# Patient Record
Sex: FEMALE | Race: WHITE | NOT HISPANIC OR LATINO
[De-identification: names, ages, dates, MRNs, and addresses within clinical notes are randomized per-mention and may not be internally consistent; named-entity substitution may affect disease eponyms.]

---

## 2017-08-29 ENCOUNTER — RESULT REVIEW (OUTPATIENT)
Age: 37
End: 2017-08-29

## 2018-03-20 ENCOUNTER — RESULT REVIEW (OUTPATIENT)
Age: 38
End: 2018-03-20

## 2018-04-13 ENCOUNTER — RESULT REVIEW (OUTPATIENT)
Age: 38
End: 2018-04-13

## 2019-05-17 ENCOUNTER — RECORD ABSTRACTING (OUTPATIENT)
Age: 39
End: 2019-05-17

## 2019-05-17 DIAGNOSIS — Z86.19 PERSONAL HISTORY OF OTHER INFECTIOUS AND PARASITIC DISEASES: ICD-10-CM

## 2019-05-17 DIAGNOSIS — Z82.49 FAMILY HISTORY OF ISCHEMIC HEART DISEASE AND OTHER DISEASES OF THE CIRCULATORY SYSTEM: ICD-10-CM

## 2019-05-17 DIAGNOSIS — Z83.3 FAMILY HISTORY OF DIABETES MELLITUS: ICD-10-CM

## 2019-05-17 DIAGNOSIS — B00.9 HERPESVIRAL INFECTION, UNSPECIFIED: ICD-10-CM

## 2019-05-17 DIAGNOSIS — R87.610 ATYPICAL SQUAMOUS CELLS OF UNDETERMINED SIGNIFICANCE ON CYTOLOGIC SMEAR OF CERVIX (ASC-US): ICD-10-CM

## 2019-05-17 LAB — CYTOLOGY CVX/VAG DOC THIN PREP: NORMAL

## 2019-05-20 ENCOUNTER — APPOINTMENT (OUTPATIENT)
Dept: OBGYN | Facility: CLINIC | Age: 39
End: 2019-05-20
Payer: MEDICAID

## 2019-05-20 ENCOUNTER — LABORATORY RESULT (OUTPATIENT)
Age: 39
End: 2019-05-20

## 2019-05-20 VITALS
BODY MASS INDEX: 21.66 KG/M2 | DIASTOLIC BLOOD PRESSURE: 78 MMHG | WEIGHT: 130 LBS | HEIGHT: 65 IN | SYSTOLIC BLOOD PRESSURE: 110 MMHG

## 2019-05-20 LAB
BILIRUB UR QL STRIP: NORMAL
CLARITY UR: CLEAR
COLLECTION METHOD: NORMAL
GLUCOSE UR-MCNC: NORMAL
HCG UR QL: 0.2 EU/DL
HCG UR QL: NEGATIVE
HGB UR QL STRIP.AUTO: NORMAL
KETONES UR-MCNC: NORMAL
LEUKOCYTE ESTERASE UR QL STRIP: NORMAL
NITRITE UR QL STRIP: NORMAL
PH UR STRIP: 5
PROT UR STRIP-MCNC: NORMAL
QUALITY CONTROL: YES
SP GR UR STRIP: 1.02

## 2019-05-20 PROCEDURE — 81003 URINALYSIS AUTO W/O SCOPE: CPT | Mod: QW

## 2019-05-20 PROCEDURE — 81025 URINE PREGNANCY TEST: CPT

## 2019-05-20 PROCEDURE — 99395 PREV VISIT EST AGE 18-39: CPT

## 2019-05-20 NOTE — COUNSELING
[Breast Self Exam] : breast self exam [Nutrition] : nutrition [Exercise] : exercise [Vitamins/Supplements] : vitamins/supplements [Contraception] : contraception [Sunscreen] : sunscreen [Safe Sexual Practices] : safe sexual practices

## 2019-05-20 NOTE — HISTORY OF PRESENT ILLNESS
[Good] : being in good health [Last Pap ___] : Last cervical pap smear was [unfilled] [Sexually Active] : is sexually active [Contraception] : uses contraception [Definite:  ___ (Date)] : the last menstrual period was [unfilled] [Menarche Age: ____] : age at menarche was [unfilled] [Natural Family Planning] : uses natural family planning [Condoms] : uses condoms [Male ___] : [unfilled] male [de-identified] : PHILLIP 8/5/16 BIRAD 1 [Burning] : no burning [Itching] : no itching [Mass] : no mass [Stinging] : no stinging [Lesion] : no lesion [Soreness] : no soreness [Discharge] : no discharge [Monogamous] : is not monogamous

## 2019-05-29 LAB
CYTOLOGY CVX/VAG DOC THIN PREP: NORMAL
HPV HIGH+LOW RISK DNA PNL CVX: DETECTED

## 2019-09-05 ENCOUNTER — TRANSCRIPTION ENCOUNTER (OUTPATIENT)
Age: 39
End: 2019-09-05

## 2019-09-05 ENCOUNTER — APPOINTMENT (OUTPATIENT)
Dept: OBGYN | Facility: CLINIC | Age: 39
End: 2019-09-05
Payer: MEDICAID

## 2019-09-05 VITALS
DIASTOLIC BLOOD PRESSURE: 62 MMHG | HEIGHT: 65 IN | BODY MASS INDEX: 21.66 KG/M2 | SYSTOLIC BLOOD PRESSURE: 106 MMHG | WEIGHT: 130 LBS

## 2019-09-05 DIAGNOSIS — Z78.9 OTHER SPECIFIED HEALTH STATUS: ICD-10-CM

## 2019-09-05 DIAGNOSIS — Z01.419 ENCOUNTER FOR GYNECOLOGICAL EXAMINATION (GENERAL) (ROUTINE) W/OUT ABNORMAL FINDINGS: ICD-10-CM

## 2019-09-05 DIAGNOSIS — Z80.3 FAMILY HISTORY OF MALIGNANT NEOPLASM OF BREAST: ICD-10-CM

## 2019-09-05 LAB
BILIRUB UR QL STRIP: NORMAL
COLLECTION METHOD: NORMAL
GLUCOSE UR-MCNC: NORMAL
HCG UR QL: 0.2 EU/DL
HCG UR QL: NEGATIVE
HGB UR QL STRIP.AUTO: NORMAL
KETONES UR-MCNC: NORMAL
LEUKOCYTE ESTERASE UR QL STRIP: NORMAL
NITRITE UR QL STRIP: NORMAL
PH UR STRIP: 7
PROT UR STRIP-MCNC: NORMAL
QUALITY CONTROL: YES
SP GR UR STRIP: 1.01

## 2019-09-05 PROCEDURE — 81003 URINALYSIS AUTO W/O SCOPE: CPT | Mod: QW

## 2019-09-05 PROCEDURE — 57454 BX/CURETT OF CERVIX W/SCOPE: CPT

## 2019-09-05 PROCEDURE — 81025 URINE PREGNANCY TEST: CPT

## 2019-09-05 RX ORDER — CHROMIUM 200 MCG
TABLET ORAL
Refills: 0 | Status: ACTIVE | COMMUNITY

## 2019-09-05 NOTE — HISTORY OF PRESENT ILLNESS
[Healthy Diet] : a healthy diet [Good] : being in good health [Regular Exercise] : regular exercise [Last Pap ___] : Last cervical pap smear was [unfilled] [Last Pap Smear ___] : last Papanicolaou cytology done [unfilled] [Reproductive Age] : is of reproductive age [Total Preg ___] : [unfilled] [Pregnancy History] : pregnancy history: [Living ___] : [unfilled] [AB Induced ___] : elective abortions: [unfilled] [Definite:  ___ (Date)] : the last menstrual period was [unfilled] [Menarche Age: ____] : age at menarche was [unfilled] [Sexually Active] : is sexually active [Male ___] : [unfilled] male [Weight Concerns] : no concerns with her weight [Menstrual Problems] : reports normal menses [Contraception] : does not use contraception [de-identified] : Breast ultrasound 8/05/2016 BI-RADS 1 [Monogamous] : is not monogamous

## 2019-09-05 NOTE — DISCUSSION/SUMMARY
[FreeTextEntry1] : 39 year old  1 para 0010, whose last menstrual period was 2019, is here for further evaluation of a her Pap smear from 2019 which showed human papilloma virus in the setting of negative cytology.\par \barrett RITCHIE had a Pap smear on 2017 that demonstrated atypical squamous cells of undetermined significance with oncogenic human papilloma virus.  She refused colposcopy despite the recommendation.  She subsequently had a Pap smear on 3/20/2018 which revealed low grade squamous intraepithelial lesion with oncogenic human papilloma virus.  Ms. SNOWDEN agreed to colposcopy which was performed by Dr. Grey on 2018.  The biopsies were negative.  \par \par The natural course of the human papilloma virus and the progressive development of cervical dysplasia and cancer were discussed at length with the patient.  Details about the colposcopic procedure were reviewed.\par \barrett RITCHIE is not using any form of contraception at this time.  She is in the process of  and we talked about how stress can affect the immune system.\par \par All of her concerns were addressed, questions answered and reassurance was given.

## 2019-09-05 NOTE — PHYSICAL EXAM
[Awake] : awake [Alert] : alert [Acute Distress] : no acute distress [Oriented x3] : oriented to person, place, and time [Labia Minora] : labia minora [Labia Majora] : labia major [Normal] : clitoris

## 2019-09-05 NOTE — PROCEDURE
[Colposcopy] : colposcopy [Patient] : patient [Risks] : risks [Alternatives] : alternatives [Benefits] : benefits [Consent Obtained] : written consent was obtained prior to the procedure [HPV high risk] : PCR positive for high risk HPV [Bleeding] : bleeding [Pap Performed] : a cervical Pap smear was not performed [Allergic Reaction] : allergic reaction [SCJ Fully Visulized] : the squamocolumnar junction was fully visualized [Non-staining ___ o'clock] : no staining at [unfilled] o'clock [Acetowhite ___ o'clock] : ascetowhite changes at [unfilled] ~Uo'clock [No Abnormalities] : no abnormalities seen [Biopsy] : the lesion was not biopsied [Biopsies Taken: # ___] : [unfilled] biopsies taken of the cervix [Biopsy Locations ___ o'clock] : the biopsies were taken at [unfilled] o'clock [Maciej's] : Maciej's solution [ECC Done] : Endocervical curettage was performed.  [Tolerated Well] : the patient tolerated the procedure well [Direct Pressure] : direct pressure [No Complications] : there were no complications

## 2019-09-09 ENCOUNTER — TRANSCRIPTION ENCOUNTER (OUTPATIENT)
Age: 39
End: 2019-09-09

## 2019-09-09 LAB — CORE LAB BIOPSY: NORMAL

## 2019-09-17 ENCOUNTER — APPOINTMENT (OUTPATIENT)
Dept: OBGYN | Facility: CLINIC | Age: 39
End: 2019-09-17
Payer: MEDICAID

## 2019-09-17 ENCOUNTER — TRANSCRIPTION ENCOUNTER (OUTPATIENT)
Age: 39
End: 2019-09-17

## 2019-09-17 VITALS
SYSTOLIC BLOOD PRESSURE: 100 MMHG | DIASTOLIC BLOOD PRESSURE: 60 MMHG | BODY MASS INDEX: 21.66 KG/M2 | HEIGHT: 65 IN | WEIGHT: 130 LBS

## 2019-09-17 DIAGNOSIS — N76.0 ACUTE VAGINITIS: ICD-10-CM

## 2019-09-17 PROCEDURE — 99213 OFFICE O/P EST LOW 20 MIN: CPT

## 2019-09-17 NOTE — PHYSICAL EXAM
[Normal] : uterus [Scant] : scant [Discharge] : a  ~M vaginal discharge was present [Nancy] : yellow [Watery] : watery [No Bleeding] : there was no active vaginal bleeding [Motion Tenderness] : there was no cervical motion tenderness [Tenderness] : nontender [Enlarged ___ wks] : not enlarged [Mass ___ cm] : no uterine mass was palpated [Uterine Adnexae] : were not tender and not enlarged

## 2019-09-17 NOTE — DISCUSSION/SUMMARY
[FreeTextEntry1] : cultures obtained.\par \par treatment for vaginitis initiated.\par \par f/u culture results.\par \par recommended to avoid intercourse x 2 weeks. if post coital bleeding returns after that time she will f/u for further evaluation.

## 2019-09-17 NOTE — HISTORY OF PRESENT ILLNESS
[___ Month(s) Ago] : [unfilled] month(s) ago [Good] : being in good health [Last Pap ___] : Last cervical pap smear was [unfilled] [Reproductive Age] : is of reproductive age [Definite:  ___ (Date)] : the last menstrual period was [unfilled] [Contraception] : does not use contraception

## 2019-09-19 LAB
C TRACH RRNA SPEC QL NAA+PROBE: NOT DETECTED
CANDIDA VAG CYTO: NOT DETECTED
G VAGINALIS+PREV SP MTYP VAG QL MICRO: NOT DETECTED
N GONORRHOEA RRNA SPEC QL NAA+PROBE: NOT DETECTED
SOURCE AMPLIFICATION: NORMAL
T VAGINALIS VAG QL WET PREP: NOT DETECTED

## 2020-01-15 ENCOUNTER — APPOINTMENT (OUTPATIENT)
Dept: OBGYN | Facility: CLINIC | Age: 40
End: 2020-01-15
Payer: MEDICAID

## 2020-01-15 VITALS
BODY MASS INDEX: 20.83 KG/M2 | WEIGHT: 125 LBS | DIASTOLIC BLOOD PRESSURE: 64 MMHG | HEIGHT: 65 IN | SYSTOLIC BLOOD PRESSURE: 108 MMHG

## 2020-01-15 PROCEDURE — 99213 OFFICE O/P EST LOW 20 MIN: CPT

## 2020-01-18 LAB
C TRACH RRNA SPEC QL NAA+PROBE: NOT DETECTED
CANDIDA VAG CYTO: NOT DETECTED
G VAGINALIS+PREV SP MTYP VAG QL MICRO: DETECTED
N GONORRHOEA RRNA SPEC QL NAA+PROBE: NOT DETECTED
SOURCE AMPLIFICATION: NORMAL
T VAGINALIS VAG QL WET PREP: NOT DETECTED

## 2020-01-20 NOTE — PHYSICAL EXAM
[Moderate] : moderate [Discharge] : a  ~M vaginal discharge was present [White] : white [Thin] : thin

## 2020-01-20 NOTE — HISTORY OF PRESENT ILLNESS
[Last Pap ___] : Last cervical pap smear was [unfilled] [Menarche Age: ____] : age at menarche was [unfilled] [Definite:  ___ (Date)] : the last menstrual period was [unfilled] [Sexually Active] : is sexually active [Male ___] : [unfilled] male [de-identified] : Breast u/s: 8/5/16 BR1 [Contraception] : does not use contraception

## 2020-01-20 NOTE — COUNSELING
[Bladder Hygiene] : bladder hygiene [Vulvar Hygiene] : vulvar hygiene [Safe Sexual Practices] : safe sexual practices

## 2020-12-21 PROBLEM — N76.0 ACUTE VAGINITIS: Status: RESOLVED | Noted: 2019-09-17 | Resolved: 2020-12-21

## 2021-01-21 ENCOUNTER — APPOINTMENT (OUTPATIENT)
Dept: OBGYN | Facility: CLINIC | Age: 41
End: 2021-01-21
Payer: MEDICAID

## 2021-01-21 ENCOUNTER — LABORATORY RESULT (OUTPATIENT)
Age: 41
End: 2021-01-21

## 2021-01-21 VITALS
SYSTOLIC BLOOD PRESSURE: 122 MMHG | DIASTOLIC BLOOD PRESSURE: 68 MMHG | WEIGHT: 127 LBS | BODY MASS INDEX: 21.16 KG/M2 | HEIGHT: 65 IN

## 2021-01-21 DIAGNOSIS — Z63.5 DISRUPTION OF FAMILY BY SEPARATION AND DIVORCE: ICD-10-CM

## 2021-01-21 DIAGNOSIS — Z01.419 ENCOUNTER FOR GYNECOLOGICAL EXAMINATION (GENERAL) (ROUTINE) W/OUT ABNORMAL FINDINGS: ICD-10-CM

## 2021-01-21 DIAGNOSIS — Z30.09 ENCOUNTER FOR OTHER GENERAL COUNSELING AND ADVICE ON CONTRACEPTION: ICD-10-CM

## 2021-01-21 PROCEDURE — 99072 ADDL SUPL MATRL&STAF TM PHE: CPT

## 2021-01-21 PROCEDURE — 99395 PREV VISIT EST AGE 18-39: CPT

## 2021-01-21 RX ORDER — METRONIDAZOLE 7.5 MG/G
0.75 GEL VAGINAL
Qty: 1 | Refills: 1 | Status: DISCONTINUED | COMMUNITY
Start: 2019-09-17 | End: 2021-01-21

## 2021-01-21 RX ORDER — METRONIDAZOLE 7.5 MG/G
0.75 GEL VAGINAL
Qty: 1 | Refills: 1 | Status: DISCONTINUED | COMMUNITY
Start: 2020-01-15 | End: 2021-01-21

## 2021-01-21 SDOH — SOCIAL STABILITY - SOCIAL INSECURITY: DISRUPTION OF FAMILY BY SEPARATION AND DIVORCE: Z63.5

## 2021-01-24 LAB
C TRACH RRNA SPEC QL NAA+PROBE: NOT DETECTED
HPV HIGH+LOW RISK DNA PNL CVX: DETECTED
N GONORRHOEA RRNA SPEC QL NAA+PROBE: NOT DETECTED
SOURCE AMPLIFICATION: NORMAL
SOURCE TP AMPLIFICATION: NORMAL
T VAGINALIS RRNA SPEC QL NAA+PROBE: NOT DETECTED

## 2021-01-24 NOTE — HISTORY OF PRESENT ILLNESS
[Patient reported PAP Smear was normal] : Patient reported PAP Smear was normal [HPV test offered] : HPV test offered [N] : Patient does not use contraception [Y] : Positive pregnancy history [Menarche Age: ____] : age at menarche was [unfilled] [Currently Active] : currently active [Men] : men [No] : No [TextBox_4] : Pt presents for an annual. [BreastSonogramDate] : 08/05/2016 [TextBox_25] : BR1 [PapSmeardate] : 05/20/2019 [HPVDate] : 05/20/2019 [TextBox_78] : POSITIVE [LMPDate] : 12/30/2020 [PGHxTotal] : 2 [[facility] CenterxFulerm] : 1 [[facility] Centeriving] : 1 [PGHxABInduced] : 1 [FreeTextEntry1] : 12/30/2020

## 2021-01-28 LAB — CYTOLOGY CVX/VAG DOC THIN PREP: ABNORMAL

## 2021-02-01 ENCOUNTER — APPOINTMENT (OUTPATIENT)
Dept: OBGYN | Facility: CLINIC | Age: 41
End: 2021-02-01
Payer: MEDICAID

## 2021-02-03 ENCOUNTER — APPOINTMENT (OUTPATIENT)
Dept: OBGYN | Facility: CLINIC | Age: 41
End: 2021-02-03
Payer: MEDICAID

## 2021-02-03 ENCOUNTER — ASOB RESULT (OUTPATIENT)
Age: 41
End: 2021-02-03

## 2021-02-03 PROCEDURE — 76830 TRANSVAGINAL US NON-OB: CPT

## 2021-02-03 PROCEDURE — 99072 ADDL SUPL MATRL&STAF TM PHE: CPT

## 2021-02-03 PROCEDURE — 76376 3D RENDER W/INTRP POSTPROCES: CPT | Mod: 59

## 2021-02-05 ENCOUNTER — NON-APPOINTMENT (OUTPATIENT)
Age: 41
End: 2021-02-05

## 2021-03-01 ENCOUNTER — APPOINTMENT (OUTPATIENT)
Dept: OBGYN | Facility: CLINIC | Age: 41
End: 2021-03-01
Payer: MEDICAID

## 2021-03-01 VITALS
SYSTOLIC BLOOD PRESSURE: 116 MMHG | HEIGHT: 65 IN | TEMPERATURE: 97.1 F | BODY MASS INDEX: 21.83 KG/M2 | DIASTOLIC BLOOD PRESSURE: 74 MMHG | WEIGHT: 131 LBS

## 2021-03-01 LAB
HCG UR QL: NEGATIVE
QUALITY CONTROL: YES

## 2021-03-01 PROCEDURE — 81025 URINE PREGNANCY TEST: CPT

## 2021-03-01 PROCEDURE — 99072 ADDL SUPL MATRL&STAF TM PHE: CPT

## 2021-03-01 PROCEDURE — 57454 BX/CURETT OF CERVIX W/SCOPE: CPT

## 2021-03-01 NOTE — HISTORY OF PRESENT ILLNESS
[N] : Patient does not use contraception [Y] : Positive pregnancy history [Menarche Age: ____] : age at menarche was [unfilled] [Currently Active] : currently active [Men] : men [Vaginal] : vaginal [No] : No [Patient refuses STI testing] : Patient refuses STI testing [TextBox_4] : pt states coming in for colposcopy [Mammogramdate] : never [BreastSonogramDate] : 8/5/16 [PapSmeardate] : 1/21/21 [TextBox_31] : asc-us [BoneDensityDate] : never [ColonoscopyDate] : never [HPVDate] : 1/21/21 [TextBox_78] : neg [LMPDate] : 2/19/21 [PGxTotal] : 1 [[facility] CenterxLiving] : 0 [PGHxABInduced] : 1 [FreeTextEntry1] : 2/19/21

## 2021-03-01 NOTE — PROCEDURE
[Colposcopy] : Colposcopy  [Time out performed] : Pre-procedure time out performed.  Patient's name, date of birth and procedure confirmed. [Consent Obtained] : Consent obtained [Risks] : risks [Benefits] : benefits [Alternatives] : alternatives [Patient] : patient [Infection] : infection [Bleeding] : bleeding [Allergic Reaction] : allergic reaction [ASCUS] : ASCUS [Colposcopy Adequate] : colposcopy adequate [ECC Performed] : ECC performed [Biopsy] : biopsy taken [Hemostasis Obtained] : Hemostasis obtained [Tolerated Well] : the patient tolerated the procedure well [HPV High Risk] : HPV high risk [No Abnormalities] : no abnormalities [de-identified] : \par  [de-identified] : 2 [de-identified] : Acetowhite area at 3 o'clock of cervix after application of Acetic Acid. No non-staining areas after application of Lugol's solution.\par  [de-identified] : 3 o'clock of cervix and endocervical curetting [de-identified] : 3 o'clock of cervix and endocervical curetting [de-identified] : with direct pressure and Monsel's solution.

## 2021-03-01 NOTE — PROCEDURE
[Colposcopy] : Colposcopy  [Time out performed] : Pre-procedure time out performed.  Patient's name, date of birth and procedure confirmed. [Consent Obtained] : Consent obtained [Risks] : risks [Benefits] : benefits [Alternatives] : alternatives [Patient] : patient [Infection] : infection [Bleeding] : bleeding [Allergic Reaction] : allergic reaction [ASCUS] : ASCUS [Colposcopy Adequate] : colposcopy adequate [ECC Performed] : ECC performed [Biopsy] : biopsy taken [Hemostasis Obtained] : Hemostasis obtained [Tolerated Well] : the patient tolerated the procedure well [HPV High Risk] : HPV high risk [No Abnormalities] : no abnormalities [de-identified] : \par  [de-identified] : 2 [de-identified] : Acetowhite area at 3 o'clock of cervix after application of Acetic Acid. No non-staining areas after application of Lugol's solution.\par  [de-identified] : 3 o'clock of cervix and endocervical curetting [de-identified] : 3 o'clock of cervix and endocervical curetting [de-identified] : with direct pressure and Monsel's solution.

## 2021-03-01 NOTE — END OF VISIT
[FreeTextEntry3] : I, lAea Morley, solely acted as a scribe for Dr. Janis Vale on 03/01/2021 . All medical entries made by the Scribe were at my, Dr. Navarro's, direction and personally dictated by me on 03/01/2021. I have reviewed the chart and agree that the record accurately reflects my personal performance of the history, physical exam, assessment and plan. I have also personally directed, reviewed and agreed with the chart.

## 2021-03-01 NOTE — DISCUSSION/SUMMARY
[FreeTextEntry1] : - Colposcopy done today for pap: ASCUS, HPV HR positive. Procedure details, r/b/a were discussed. Consent was signed. Please see procedure details above. Pelvic rest x 2 weeks discussed. Post procedure expectations and call parameters were reviewed. \par \par  - Prior pelvic sono on 2/5/21, which was ordered for post-coital bleeding, was significant for "Irregular endocervical cavity walls, polyps suspected". Endocervical curetting done today. We discussed possible repeat ultrasound or hysteroscopy if she continues to have PMB.\par \par - She will call back office in 2 weeks for results.

## 2021-03-01 NOTE — HISTORY OF PRESENT ILLNESS
[N] : Patient does not use contraception [Y] : Positive pregnancy history [Menarche Age: ____] : age at menarche was [unfilled] [Currently Active] : currently active [Men] : men [Vaginal] : vaginal [No] : No [Patient refuses STI testing] : Patient refuses STI testing [TextBox_4] : pt states coming in for colposcopy [Mammogramdate] : never [BreastSonogramDate] : 8/5/16 [PapSmeardate] : 1/21/21 [TextBox_31] : asc-us [BoneDensityDate] : never [ColonoscopyDate] : never [HPVDate] : 1/21/21 [TextBox_78] : neg [LMPDate] : 2/19/21 [PGxTotal] : 1 [[facility]xLiving] : 0 [PGHxABInduced] : 1 [FreeTextEntry1] : 2/19/21

## 2021-03-01 NOTE — END OF VISIT
[FreeTextEntry3] : I, Alea Morley, solely acted as a scribe for Dr. Janis Vale on 03/01/2021 . All medical entries made by the Scribe were at my, Dr. Navarro's, direction and personally dictated by me on 03/01/2021. I have reviewed the chart and agree that the record accurately reflects my personal performance of the history, physical exam, assessment and plan. I have also personally directed, reviewed and agreed with the chart.

## 2021-03-22 ENCOUNTER — NON-APPOINTMENT (OUTPATIENT)
Age: 41
End: 2021-03-22

## 2021-04-06 LAB — CORE LAB BIOPSY: NORMAL

## 2021-10-21 ENCOUNTER — NON-APPOINTMENT (OUTPATIENT)
Age: 41
End: 2021-10-21

## 2021-10-22 ENCOUNTER — APPOINTMENT (OUTPATIENT)
Dept: OBGYN | Facility: CLINIC | Age: 41
End: 2021-10-22
Payer: MEDICAID

## 2021-10-22 ENCOUNTER — NON-APPOINTMENT (OUTPATIENT)
Age: 41
End: 2021-10-22

## 2021-10-22 VITALS
HEIGHT: 65 IN | WEIGHT: 125 LBS | BODY MASS INDEX: 20.83 KG/M2 | SYSTOLIC BLOOD PRESSURE: 110 MMHG | DIASTOLIC BLOOD PRESSURE: 68 MMHG

## 2021-10-22 LAB
HCG UR QL: NEGATIVE
QUALITY CONTROL: YES

## 2021-10-22 PROCEDURE — 99213 OFFICE O/P EST LOW 20 MIN: CPT

## 2021-10-22 PROCEDURE — 81025 URINE PREGNANCY TEST: CPT

## 2021-10-22 NOTE — PHYSICAL EXAM
[Appropriately responsive] : appropriately responsive [Alert] : alert [No Acute Distress] : no acute distress [Soft] : soft [Non-tender] : non-tender [Non-distended] : non-distended [Oriented x3] : oriented x3 [Labia Majora] : normal [Labia Minora] : normal [Discharge] : a  ~M vaginal discharge was present [Scant] : scant [White] : white [Thin] : thin [No Bleeding] : There was no active vaginal bleeding [Normal] : normal [Uterine Adnexae] : normal [Foul Smelling] : not foul smelling

## 2021-10-22 NOTE — HISTORY OF PRESENT ILLNESS
[N] : Patient does not use contraception [Monogamous (Male Partner)] : is monogamous with a male partner [Y] : Positive pregnancy history [Ultrasound] : ultrasound [Menarche Age: ____] : age at menarche was [unfilled] [Currently Active] : currently active [Men] : men [Vaginal] : vaginal [No] : No [TextBox_4] : Pt presents with complaints of bleeding after intercourse [PapSmeardate] : 01/21/2021 [TextBox_31] : ASCUS,HPV POS [GonorrheaDate] : 01/21/2021 [TextBox_63] : NEG [ChlamydiaDate] : 01/21/2021 [TextBox_68] : NEG [TrichomonasDate] : 01/21/2021 [TextBox_73] : NEG [HPVDate] : 01/21/2021 [TextBox_78] : POSITIVE [LMPDate] : 10/03/2021 [PGxTotal] : 1 [PGHxABInduced] : 1 [TextBox_27] : 02/03/2021 [FreeTextEntry1] : 10/03/2021

## 2021-10-22 NOTE — DISCUSSION/SUMMARY
[FreeTextEntry1] : \par Negative urine pregnancy test.\par \par We discussed the benign findings on physical exam. Affirm culture collected to r/o vaginitis. Will follow up with results and tx prn. Patient was educated about vulvar hygiene. Recommend avoidance of douching, perfumes, and lotions on/around vulva and in vagina. Reviewed recommendations for fragrance-free detergents and lotion. Reviewed recommendations for sleepwear and cotton underwear, avoidance of synthetic fabrics. Recommended trial of organic cotton pads. Avoid using panty liners and pads. \par \par Differentials for post coital bleeding reviewed with patient. Pelvic ultrasound to be done and gyn followup afterwards to review sonogram. \par \par All questions and concerns were addressed.\par \par RTO for pelvic sono and as needed.

## 2021-10-25 DIAGNOSIS — N76.0 ACUTE VAGINITIS: ICD-10-CM

## 2021-10-25 DIAGNOSIS — B96.89 ACUTE VAGINITIS: ICD-10-CM

## 2021-10-27 ENCOUNTER — NON-APPOINTMENT (OUTPATIENT)
Age: 41
End: 2021-10-27

## 2021-10-28 ENCOUNTER — NON-APPOINTMENT (OUTPATIENT)
Age: 41
End: 2021-10-28

## 2021-11-05 ENCOUNTER — APPOINTMENT (OUTPATIENT)
Dept: OBGYN | Facility: CLINIC | Age: 41
End: 2021-11-05
Payer: MEDICAID

## 2021-11-05 ENCOUNTER — ASOB RESULT (OUTPATIENT)
Age: 41
End: 2021-11-05

## 2021-11-05 VITALS
DIASTOLIC BLOOD PRESSURE: 68 MMHG | BODY MASS INDEX: 20.83 KG/M2 | SYSTOLIC BLOOD PRESSURE: 110 MMHG | HEIGHT: 65 IN | WEIGHT: 125 LBS

## 2021-11-05 PROCEDURE — 76830 TRANSVAGINAL US NON-OB: CPT

## 2021-11-05 PROCEDURE — 99213 OFFICE O/P EST LOW 20 MIN: CPT | Mod: 25

## 2021-11-05 NOTE — DISCUSSION/SUMMARY
[FreeTextEntry1] : 40 y/o \par \par 21 US: uterus 9 cm. EMs 9.9. RO 2.99 with 2 cm simple cyst. LO 2.75 cm. \par \par #hx of PCB\par -suspect due to prior BV\par -sx have resolved for both PCB and BV\par -vaginal hygiene discussed\par -con't probiotic\par -normal US reviewed\par \par RTO in 2022 for her gyn annual exam to repeat pap or prn.

## 2021-11-05 NOTE — END OF VISIT
[FreeTextEntry3] : I, Alea Morley, solely acted as a scribe for Dr. Elodia Schafer on 11/05/2021. All medical entries made by the Scribe were at my, Dr. Schafer's, direction and personally dictated by me on 11/05/2021. I have reviewed the chart and agree that the record accurately reflects my personal performance of the history, physical exam, assessment and plan. I have also personally directed, reviewed and agreed with the chart.  [Time Spent: ___ minutes] : I have spent [unfilled] minutes of time on the encounter.

## 2021-11-05 NOTE — HISTORY OF PRESENT ILLNESS
[Gonorrhea test offered] : Gonorrhea test offered [Chlamydia test offered] : Chlamydia test offered [HPV test offered] : HPV test offered [N] : Patient does not use contraception [Y] : Patient is sexually active [Menarche Age: ____] : age at menarche was [unfilled] [Currently Active] : currently active [Men] : men [No] : No [BreastSonogramDate] : 8/5/16 [TextBox_25] : BR1 [PapSmeardate] : 1/21/21 [TextBox_31] : ASCUS [GonorrheaDate] : 10/22/21 [TextBox_63] : NEG [ChlamydiaDate] : 10/22/21 [TextBox_68] : NEG [HPVDate] : 1/21/21 [TextBox_78] : POSITIVE [LMPDate] : 10/27/21 [PGxTotal] : 1 [PGHxABInduced] : 1 [FreeTextEntry1] : 10/27/21

## 2021-12-26 ENCOUNTER — NON-APPOINTMENT (OUTPATIENT)
Age: 41
End: 2021-12-26

## 2022-02-03 ENCOUNTER — APPOINTMENT (OUTPATIENT)
Dept: OBGYN | Facility: CLINIC | Age: 42
End: 2022-02-03

## 2022-02-07 ENCOUNTER — NON-APPOINTMENT (OUTPATIENT)
Age: 42
End: 2022-02-07

## 2022-02-10 ENCOUNTER — LABORATORY RESULT (OUTPATIENT)
Age: 42
End: 2022-02-10

## 2022-02-10 ENCOUNTER — APPOINTMENT (OUTPATIENT)
Dept: OBGYN | Facility: CLINIC | Age: 42
End: 2022-02-10
Payer: MEDICAID

## 2022-02-10 VITALS
HEIGHT: 65 IN | BODY MASS INDEX: 20.49 KG/M2 | SYSTOLIC BLOOD PRESSURE: 140 MMHG | WEIGHT: 123 LBS | DIASTOLIC BLOOD PRESSURE: 86 MMHG

## 2022-02-10 DIAGNOSIS — N89.8 OTHER SPECIFIED NONINFLAMMATORY DISORDERS OF VAGINA: ICD-10-CM

## 2022-02-10 DIAGNOSIS — N93.0 POSTCOITAL AND CONTACT BLEEDING: ICD-10-CM

## 2022-02-10 DIAGNOSIS — R87.810 ATYPICAL SQUAMOUS CELLS OF UNDETERMINED SIGNIFICANCE ON CYTOLOGIC SMEAR OF CERVIX (ASC-US): ICD-10-CM

## 2022-02-10 DIAGNOSIS — Z11.51 ENCOUNTER FOR SCREENING FOR HUMAN PAPILLOMAVIRUS (HPV): ICD-10-CM

## 2022-02-10 DIAGNOSIS — Z11.3 ENCOUNTER FOR SCREENING FOR INFECTIONS WITH A PREDOMINANTLY SEXUAL MODE OF TRANSMISSION: ICD-10-CM

## 2022-02-10 DIAGNOSIS — R87.610 ATYPICAL SQUAMOUS CELLS OF UNDETERMINED SIGNIFICANCE ON CYTOLOGIC SMEAR OF CERVIX (ASC-US): ICD-10-CM

## 2022-02-10 DIAGNOSIS — N90.7 VULVAR CYST: ICD-10-CM

## 2022-02-10 DIAGNOSIS — Z01.419 ENCOUNTER FOR GYNECOLOGICAL EXAMINATION (GENERAL) (ROUTINE) W/OUT ABNORMAL FINDINGS: ICD-10-CM

## 2022-02-10 PROCEDURE — 99395 PREV VISIT EST AGE 18-39: CPT

## 2022-02-13 PROBLEM — Z11.3 SCREEN FOR STD (SEXUALLY TRANSMITTED DISEASE): Status: RESOLVED | Noted: 2020-01-20 | Resolved: 2022-02-13

## 2022-02-13 PROBLEM — N90.7 SEBACEOUS CYST OF LABIA: Status: RESOLVED | Noted: 2020-01-20 | Resolved: 2022-02-13

## 2022-02-13 PROBLEM — N93.0 POSTCOITAL BLEEDING: Status: RESOLVED | Noted: 2019-09-17 | Resolved: 2022-02-13

## 2022-02-13 PROBLEM — R87.610 ASCUS WITH POSITIVE HIGH RISK HPV CERVICAL: Status: RESOLVED | Noted: 2021-03-01 | Resolved: 2022-02-13

## 2022-02-13 PROBLEM — N93.0 PCB (POST COITAL BLEEDING): Status: RESOLVED | Noted: 2021-01-21 | Resolved: 2022-02-13

## 2022-02-13 RX ORDER — METRONIDAZOLE 7.5 MG/G
0.75 GEL VAGINAL
Qty: 1 | Refills: 0 | Status: DISCONTINUED | COMMUNITY
Start: 2021-10-25 | End: 2022-02-13

## 2022-02-13 NOTE — DISCUSSION/SUMMARY
[FreeTextEntry1] : Pt aware pending pap result any further f/u.  Pt All the pt's questions and concerns were addressed.

## 2022-02-13 NOTE — HISTORY OF PRESENT ILLNESS
[N] : Patient does not use contraception [Y] : Positive pregnancy history [Menarche Age: ____] : age at menarche was [unfilled] [No] : Patient does not have concerns regarding sex [Currently Active] : currently active [BreastSonogramDate] : 08/05/16 [TextBox_25] : BR1 [PapSmeardate] : 01/21/21 [TextBox_31] : ASCUS [GonorrheaDate] : 10/22/21 [TextBox_63] : NEG [ChlamydiaDate] : 10/22/21 [TextBox_68] : NEG [HPVDate] : 01/21/21 [TextBox_78] : POS [LMPDate] : 01/31/22 [PGxTotal] : 1 [[facility]xLiving] : 0 [PGHxABInduced] : 1 [FreeTextEntry1] : 01/31/22

## 2022-03-01 ENCOUNTER — NON-APPOINTMENT (OUTPATIENT)
Age: 42
End: 2022-03-01

## 2022-03-06 LAB
CYTOLOGY CVX/VAG DOC THIN PREP: ABNORMAL
HPV HIGH+LOW RISK DNA PNL CVX: DETECTED

## 2022-04-11 ENCOUNTER — APPOINTMENT (OUTPATIENT)
Dept: OBGYN | Facility: CLINIC | Age: 42
End: 2022-04-11

## 2022-04-22 ENCOUNTER — APPOINTMENT (OUTPATIENT)
Dept: OBGYN | Facility: CLINIC | Age: 42
End: 2022-04-22
Payer: MEDICAID

## 2022-04-22 ENCOUNTER — ASOB RESULT (OUTPATIENT)
Age: 42
End: 2022-04-22

## 2022-04-22 ENCOUNTER — APPOINTMENT (OUTPATIENT)
Dept: ANTEPARTUM | Facility: CLINIC | Age: 42
End: 2022-04-22
Payer: MEDICAID

## 2022-04-22 ENCOUNTER — LABORATORY RESULT (OUTPATIENT)
Age: 42
End: 2022-04-22

## 2022-04-22 VITALS
DIASTOLIC BLOOD PRESSURE: 64 MMHG | WEIGHT: 126 LBS | BODY MASS INDEX: 20.99 KG/M2 | SYSTOLIC BLOOD PRESSURE: 112 MMHG | HEIGHT: 65 IN

## 2022-04-22 DIAGNOSIS — Z13.29 ENCOUNTER FOR SCREENING FOR OTHER SUSPECTED ENDOCRINE DISORDER: ICD-10-CM

## 2022-04-22 DIAGNOSIS — Z11.3 ENCOUNTER FOR SCREENING FOR INFECTIONS WITH A PREDOMINANTLY SEXUAL MODE OF TRANSMISSION: ICD-10-CM

## 2022-04-22 PROCEDURE — 76817 TRANSVAGINAL US OBSTETRIC: CPT

## 2022-04-22 PROCEDURE — 99214 OFFICE O/P EST MOD 30 MIN: CPT | Mod: TH

## 2022-04-24 LAB
BASOPHILS # BLD AUTO: 0.03 K/UL
BASOPHILS NFR BLD AUTO: 0.4 %
C TRACH RRNA SPEC QL NAA+PROBE: NOT DETECTED
EOSINOPHIL # BLD AUTO: 0.11 K/UL
EOSINOPHIL NFR BLD AUTO: 1.3 %
ESTIMATED AVERAGE GLUCOSE: 105 MG/DL
HBA1C MFR BLD HPLC: 5.3 %
HBV SURFACE AG SER QL: NONREACTIVE
HCT VFR BLD CALC: 38.8 %
HGB BLD-MCNC: 12.9 G/DL
HIV1+2 AB SPEC QL IA.RAPID: NONREACTIVE
IMM GRANULOCYTES NFR BLD AUTO: 0.2 %
LYMPHOCYTES # BLD AUTO: 1.52 K/UL
LYMPHOCYTES NFR BLD AUTO: 17.9 %
M TB IFN-G BLD-IMP: NEGATIVE
MAN DIFF?: NORMAL
MCHC RBC-ENTMCNC: 32 PG
MCHC RBC-ENTMCNC: 33.2 GM/DL
MCV RBC AUTO: 96.3 FL
MEV IGG FLD QL IA: >300 AU/ML
MEV IGG+IGM SER-IMP: POSITIVE
MONOCYTES # BLD AUTO: 0.55 K/UL
MONOCYTES NFR BLD AUTO: 6.5 %
N GONORRHOEA RRNA SPEC QL NAA+PROBE: NOT DETECTED
NEUTROPHILS # BLD AUTO: 6.27 K/UL
NEUTROPHILS NFR BLD AUTO: 73.7 %
PLATELET # BLD AUTO: 250 K/UL
QUANTIFERON TB PLUS MITOGEN MINUS NIL: 9.99 IU/ML
QUANTIFERON TB PLUS NIL: 0.01 IU/ML
QUANTIFERON TB PLUS TB1 MINUS NIL: 0.01 IU/ML
QUANTIFERON TB PLUS TB2 MINUS NIL: 0.02 IU/ML
RBC # BLD: 4.03 M/UL
RBC # FLD: 12.2 %
RUBV IGG FLD-ACNC: 7.3 INDEX
RUBV IGG SER-IMP: POSITIVE
SOURCE AMPLIFICATION: NORMAL
T PALLIDUM AB SER QL IA: NEGATIVE
TSH SERPL-ACNC: 0.86 UIU/ML
VZV AB TITR SER: POSITIVE
VZV IGG SER IF-ACNC: 1956 INDEX
WBC # FLD AUTO: 8.5 K/UL

## 2022-04-25 ENCOUNTER — NON-APPOINTMENT (OUTPATIENT)
Age: 42
End: 2022-04-25

## 2022-04-26 ENCOUNTER — ASOB RESULT (OUTPATIENT)
Age: 42
End: 2022-04-26

## 2022-04-26 ENCOUNTER — APPOINTMENT (OUTPATIENT)
Dept: MATERNAL FETAL MEDICINE | Facility: CLINIC | Age: 42
End: 2022-04-26
Payer: MEDICAID

## 2022-04-26 ENCOUNTER — NON-APPOINTMENT (OUTPATIENT)
Age: 42
End: 2022-04-26

## 2022-04-26 PROCEDURE — 99202 OFFICE O/P NEW SF 15 MIN: CPT | Mod: TH,95

## 2022-04-28 ENCOUNTER — NON-APPOINTMENT (OUTPATIENT)
Age: 42
End: 2022-04-28

## 2022-04-29 ENCOUNTER — TRANSCRIPTION ENCOUNTER (OUTPATIENT)
Age: 42
End: 2022-04-29

## 2022-04-30 LAB
ABO + RH PNL BLD: NORMAL
AR GENE MUT ANL BLD/T: NORMAL
BACTERIA UR CULT: NORMAL
BLD GP AB SCN SERPL QL: NORMAL
CFTR MUT TESTED BLD/T: NEGATIVE
HGB A MFR BLD: 96.9 %
HGB A2 MFR BLD: 2.4 %
HGB F MFR BLD: 0.7 %
HGB FRACT BLD-IMP: NORMAL

## 2022-05-03 ENCOUNTER — TRANSCRIPTION ENCOUNTER (OUTPATIENT)
Age: 42
End: 2022-05-03

## 2022-05-05 ENCOUNTER — TRANSCRIPTION ENCOUNTER (OUTPATIENT)
Age: 42
End: 2022-05-05

## 2022-05-05 LAB — FMR1 GENE MUT ANL BLD/T: NORMAL

## 2022-05-10 ENCOUNTER — APPOINTMENT (OUTPATIENT)
Dept: OBGYN | Facility: CLINIC | Age: 42
End: 2022-05-10
Payer: MEDICAID

## 2022-05-10 VITALS — SYSTOLIC BLOOD PRESSURE: 100 MMHG | BODY MASS INDEX: 20.8 KG/M2 | DIASTOLIC BLOOD PRESSURE: 66 MMHG | WEIGHT: 125 LBS

## 2022-05-10 DIAGNOSIS — Z32.01 ENCOUNTER FOR PREGNANCY TEST, RESULT POSITIVE: ICD-10-CM

## 2022-05-10 PROCEDURE — 57455 BIOPSY OF CERVIX W/SCOPE: CPT

## 2022-05-11 ENCOUNTER — NON-APPOINTMENT (OUTPATIENT)
Age: 42
End: 2022-05-11

## 2022-05-11 PROBLEM — Z32.01 POSITIVE URINE PREGNANCY TEST: Status: ACTIVE | Noted: 2022-05-11

## 2022-05-11 LAB
HCG UR QL: POSITIVE
QUALITY CONTROL: YES

## 2022-05-11 NOTE — PROCEDURE
[Colposcopy] : Colposcopy  [Consent Obtained] : Consent obtained [Risks] : risks [Benefits] : benefits [Alternatives] : alternatives [Patient] : patient [ASCUS] : ASCUS [HPV High Risk] : HPV high risk [No Premedication] : no premedication [Colposcopy Adequate] : colposcopy adequate [SCI Fully Visualized] : SCI fully visualized [ECC Performed] : ECC not performed [Lesion] : lesion seen [Biopsy] : biopsy taken [Hemostasis Obtained] : Hemostasis obtained [Tolerated Well] : the patient tolerated the procedure well [de-identified] : 1 [de-identified] : small acetowhite around transition zone, not biopsied\par \par acetowhite with atypical vessel seen away from os at 10 o clock, small biopsy taken [de-identified] : 10 o clock [de-identified] : likely normal to low-grade change, biopsy taken of one possible abnormal area but could be nabothian

## 2022-05-13 ENCOUNTER — APPOINTMENT (OUTPATIENT)
Dept: ANTEPARTUM | Facility: CLINIC | Age: 42
End: 2022-05-13
Payer: MEDICAID

## 2022-05-13 ENCOUNTER — LABORATORY RESULT (OUTPATIENT)
Age: 42
End: 2022-05-13

## 2022-05-13 ENCOUNTER — APPOINTMENT (OUTPATIENT)
Dept: MATERNAL FETAL MEDICINE | Facility: CLINIC | Age: 42
End: 2022-05-13
Payer: MEDICAID

## 2022-05-13 ENCOUNTER — ASOB RESULT (OUTPATIENT)
Age: 42
End: 2022-05-13

## 2022-05-13 DIAGNOSIS — Z14.8 GENETIC CARRIER OF OTHER DISEASE: ICD-10-CM

## 2022-05-13 PROCEDURE — 76945 ECHO GUIDE VILLUS SAMPLING: CPT

## 2022-05-13 PROCEDURE — ZZZZZ: CPT

## 2022-05-13 PROCEDURE — 59015 CHORION BIOPSY: CPT

## 2022-05-19 ENCOUNTER — APPOINTMENT (OUTPATIENT)
Dept: OBGYN | Facility: CLINIC | Age: 42
End: 2022-05-19
Payer: MEDICAID

## 2022-05-19 ENCOUNTER — NON-APPOINTMENT (OUTPATIENT)
Age: 42
End: 2022-05-19

## 2022-05-19 ENCOUNTER — APPOINTMENT (OUTPATIENT)
Dept: ANTEPARTUM | Facility: CLINIC | Age: 42
End: 2022-05-19
Payer: MEDICAID

## 2022-05-19 ENCOUNTER — ASOB RESULT (OUTPATIENT)
Age: 42
End: 2022-05-19

## 2022-05-19 ENCOUNTER — TRANSCRIPTION ENCOUNTER (OUTPATIENT)
Age: 42
End: 2022-05-19

## 2022-05-19 VITALS
HEIGHT: 65 IN | SYSTOLIC BLOOD PRESSURE: 118 MMHG | DIASTOLIC BLOOD PRESSURE: 78 MMHG | WEIGHT: 129 LBS | BODY MASS INDEX: 21.49 KG/M2

## 2022-05-19 LAB
BILIRUB UR QL STRIP: NORMAL
CORE LAB BIOPSY: NORMAL
GLUCOSE UR-MCNC: NORMAL
HCG UR QL: 0.2 EU/DL
HGB UR QL STRIP.AUTO: NORMAL
KETONES UR-MCNC: NORMAL
LEUKOCYTE ESTERASE UR QL STRIP: NORMAL
NITRITE UR QL STRIP: NORMAL
PH UR STRIP: 5.5
PROT UR STRIP-MCNC: NORMAL
SP GR UR STRIP: <=1.005

## 2022-05-19 PROCEDURE — 76813 OB US NUCHAL MEAS 1 GEST: CPT

## 2022-05-19 PROCEDURE — 99213 OFFICE O/P EST LOW 20 MIN: CPT | Mod: TH

## 2022-05-21 ENCOUNTER — TRANSCRIPTION ENCOUNTER (OUTPATIENT)
Age: 42
End: 2022-05-21

## 2022-05-23 ENCOUNTER — TRANSCRIPTION ENCOUNTER (OUTPATIENT)
Age: 42
End: 2022-05-23

## 2022-05-31 ENCOUNTER — NON-APPOINTMENT (OUTPATIENT)
Age: 42
End: 2022-05-31

## 2022-06-01 ENCOUNTER — TRANSCRIPTION ENCOUNTER (OUTPATIENT)
Age: 42
End: 2022-06-01

## 2022-06-20 ENCOUNTER — TRANSCRIPTION ENCOUNTER (OUTPATIENT)
Age: 42
End: 2022-06-20

## 2022-06-24 ENCOUNTER — APPOINTMENT (OUTPATIENT)
Dept: OBGYN | Facility: CLINIC | Age: 42
End: 2022-06-24
Payer: MEDICAID

## 2022-06-24 VITALS
WEIGHT: 132 LBS | SYSTOLIC BLOOD PRESSURE: 116 MMHG | HEIGHT: 65 IN | BODY MASS INDEX: 21.99 KG/M2 | DIASTOLIC BLOOD PRESSURE: 64 MMHG

## 2022-06-24 PROCEDURE — 99213 OFFICE O/P EST LOW 20 MIN: CPT | Mod: TH

## 2022-06-25 LAB
BILIRUB UR QL STRIP: NORMAL
GLUCOSE UR-MCNC: NORMAL
HCG UR QL: 0.2 EU/DL
HGB UR QL STRIP.AUTO: NORMAL
KETONES UR-MCNC: NORMAL
LEUKOCYTE ESTERASE UR QL STRIP: NORMAL
NITRITE UR QL STRIP: NORMAL
PH UR STRIP: 7
PROT UR STRIP-MCNC: ABNORMAL
SP GR UR STRIP: 1.02

## 2022-06-27 ENCOUNTER — NON-APPOINTMENT (OUTPATIENT)
Age: 42
End: 2022-06-27

## 2022-06-28 LAB
2ND TRIMESTER DATA: NORMAL
AFP PNL SERPL: NORMAL
AFP SERPL-ACNC: NORMAL
CLINICAL BIOCHEMIST REVIEW: NORMAL
NOTES NTD: NORMAL

## 2022-07-01 ENCOUNTER — NON-APPOINTMENT (OUTPATIENT)
Age: 42
End: 2022-07-01

## 2022-07-15 ENCOUNTER — TRANSCRIPTION ENCOUNTER (OUTPATIENT)
Age: 42
End: 2022-07-15

## 2022-07-18 ENCOUNTER — APPOINTMENT (OUTPATIENT)
Dept: ANTEPARTUM | Facility: CLINIC | Age: 42
End: 2022-07-18

## 2022-07-27 ENCOUNTER — ASOB RESULT (OUTPATIENT)
Age: 42
End: 2022-07-27

## 2022-07-27 ENCOUNTER — APPOINTMENT (OUTPATIENT)
Dept: ANTEPARTUM | Facility: CLINIC | Age: 42
End: 2022-07-27

## 2022-07-27 PROCEDURE — 76811 OB US DETAILED SNGL FETUS: CPT

## 2022-07-27 PROCEDURE — 76817 TRANSVAGINAL US OBSTETRIC: CPT

## 2022-07-29 ENCOUNTER — APPOINTMENT (OUTPATIENT)
Dept: OBGYN | Facility: CLINIC | Age: 42
End: 2022-07-29

## 2022-07-29 VITALS
SYSTOLIC BLOOD PRESSURE: 104 MMHG | DIASTOLIC BLOOD PRESSURE: 58 MMHG | HEIGHT: 65 IN | WEIGHT: 138 LBS | BODY MASS INDEX: 22.99 KG/M2

## 2022-07-29 LAB
BILIRUB UR QL STRIP: NORMAL
GLUCOSE UR-MCNC: NORMAL
HCG UR QL: 0.2 EU/DL
HGB UR QL STRIP.AUTO: NORMAL
KETONES UR-MCNC: NORMAL
LEUKOCYTE ESTERASE UR QL STRIP: NORMAL
NITRITE UR QL STRIP: NORMAL
PH UR STRIP: 5.5
PROT UR STRIP-MCNC: NORMAL
SP GR UR STRIP: 1.02

## 2022-07-29 PROCEDURE — 99213 OFFICE O/P EST LOW 20 MIN: CPT | Mod: TH

## 2022-08-19 ENCOUNTER — NON-APPOINTMENT (OUTPATIENT)
Age: 42
End: 2022-08-19

## 2022-08-19 ENCOUNTER — APPOINTMENT (OUTPATIENT)
Dept: OBGYN | Facility: CLINIC | Age: 42
End: 2022-08-19

## 2022-08-19 VITALS
SYSTOLIC BLOOD PRESSURE: 120 MMHG | HEIGHT: 65 IN | DIASTOLIC BLOOD PRESSURE: 72 MMHG | WEIGHT: 142 LBS | BODY MASS INDEX: 23.66 KG/M2

## 2022-08-19 DIAGNOSIS — Z13.1 ENCOUNTER FOR SCREENING FOR DIABETES MELLITUS: ICD-10-CM

## 2022-08-19 PROCEDURE — 99213 OFFICE O/P EST LOW 20 MIN: CPT | Mod: TH

## 2022-08-19 PROCEDURE — 81003 URINALYSIS AUTO W/O SCOPE: CPT | Mod: QW

## 2022-08-23 LAB
BASOPHILS # BLD AUTO: 0.05 K/UL
BASOPHILS NFR BLD AUTO: 0.5 %
BILIRUB UR QL STRIP: NORMAL
EOSINOPHIL # BLD AUTO: 0.26 K/UL
EOSINOPHIL NFR BLD AUTO: 2.5 %
GLUCOSE 1H P 50 G GLC PO SERPL-MCNC: 129 MG/DL
GLUCOSE UR-MCNC: NORMAL
HCG UR QL: 0.2 EU/DL
HCT VFR BLD CALC: 36.2 %
HGB BLD-MCNC: 11.9 G/DL
HGB UR QL STRIP.AUTO: NORMAL
IMM GRANULOCYTES NFR BLD AUTO: 1 %
KETONES UR-MCNC: NORMAL
LEUKOCYTE ESTERASE UR QL STRIP: NORMAL
LYMPHOCYTES # BLD AUTO: 1.68 K/UL
LYMPHOCYTES NFR BLD AUTO: 16.2 %
MAN DIFF?: NORMAL
MCHC RBC-ENTMCNC: 32.9 GM/DL
MCHC RBC-ENTMCNC: 33 PG
MCV RBC AUTO: 100.3 FL
MONOCYTES # BLD AUTO: 0.58 K/UL
MONOCYTES NFR BLD AUTO: 5.6 %
NEUTROPHILS # BLD AUTO: 7.72 K/UL
NEUTROPHILS NFR BLD AUTO: 74.2 %
NITRITE UR QL STRIP: NORMAL
PH UR STRIP: 7
PLATELET # BLD AUTO: 184 K/UL
PROT UR STRIP-MCNC: NORMAL
RBC # BLD: 3.61 M/UL
RBC # FLD: 13.2 %
SP GR UR STRIP: 1.01
WBC # FLD AUTO: 10.39 K/UL

## 2022-09-12 ENCOUNTER — NON-APPOINTMENT (OUTPATIENT)
Age: 42
End: 2022-09-12

## 2022-09-12 ENCOUNTER — TRANSCRIPTION ENCOUNTER (OUTPATIENT)
Age: 42
End: 2022-09-12

## 2022-09-15 ENCOUNTER — APPOINTMENT (OUTPATIENT)
Dept: OBGYN | Facility: CLINIC | Age: 42
End: 2022-09-15

## 2022-09-15 VITALS
BODY MASS INDEX: 24.32 KG/M2 | HEIGHT: 65 IN | SYSTOLIC BLOOD PRESSURE: 90 MMHG | WEIGHT: 146 LBS | DIASTOLIC BLOOD PRESSURE: 68 MMHG

## 2022-09-15 LAB
BILIRUB UR QL STRIP: NEGATIVE
GLUCOSE UR-MCNC: NEGATIVE
HCG UR QL: 0.2 EU/DL
HGB UR QL STRIP.AUTO: NEGATIVE
KETONES UR-MCNC: NEGATIVE
LEUKOCYTE ESTERASE UR QL STRIP: ABNORMAL
NITRITE UR QL STRIP: NEGATIVE
PH UR STRIP: 7
PROT UR STRIP-MCNC: NEGATIVE
SP GR UR STRIP: 1.01

## 2022-09-15 PROCEDURE — 81003 URINALYSIS AUTO W/O SCOPE: CPT | Mod: QW

## 2022-09-15 PROCEDURE — 99214 OFFICE O/P EST MOD 30 MIN: CPT | Mod: TH

## 2022-09-16 ENCOUNTER — ASOB RESULT (OUTPATIENT)
Age: 42
End: 2022-09-16

## 2022-09-16 ENCOUNTER — APPOINTMENT (OUTPATIENT)
Dept: ANTEPARTUM | Facility: CLINIC | Age: 42
End: 2022-09-16

## 2022-09-16 PROCEDURE — 76816 OB US FOLLOW-UP PER FETUS: CPT

## 2022-09-16 PROCEDURE — 76819 FETAL BIOPHYS PROFIL W/O NST: CPT | Mod: 59

## 2022-09-19 ENCOUNTER — TRANSCRIPTION ENCOUNTER (OUTPATIENT)
Age: 42
End: 2022-09-19

## 2022-09-19 ENCOUNTER — NON-APPOINTMENT (OUTPATIENT)
Age: 42
End: 2022-09-19

## 2022-09-23 ENCOUNTER — NON-APPOINTMENT (OUTPATIENT)
Age: 42
End: 2022-09-23

## 2022-10-11 ENCOUNTER — NON-APPOINTMENT (OUTPATIENT)
Age: 42
End: 2022-10-11

## 2022-10-11 ENCOUNTER — APPOINTMENT (OUTPATIENT)
Dept: ANTEPARTUM | Facility: CLINIC | Age: 42
End: 2022-10-11

## 2022-10-11 ENCOUNTER — APPOINTMENT (OUTPATIENT)
Dept: OBGYN | Facility: CLINIC | Age: 42
End: 2022-10-11

## 2022-10-11 ENCOUNTER — ASOB RESULT (OUTPATIENT)
Age: 42
End: 2022-10-11

## 2022-10-11 VITALS
WEIGHT: 152 LBS | SYSTOLIC BLOOD PRESSURE: 102 MMHG | HEIGHT: 65 IN | DIASTOLIC BLOOD PRESSURE: 64 MMHG | BODY MASS INDEX: 25.33 KG/M2

## 2022-10-11 DIAGNOSIS — O09.519 SUPERVISION OF ELDERLY PRIMIGRAVIDA, UNSPECIFIED TRIMESTER: ICD-10-CM

## 2022-10-11 DIAGNOSIS — Z23 ENCOUNTER FOR IMMUNIZATION: ICD-10-CM

## 2022-10-11 LAB
BILIRUB UR QL STRIP: NORMAL
GLUCOSE UR-MCNC: NORMAL
HCG UR QL: 0.2 EU/DL
HGB UR QL STRIP.AUTO: NORMAL
KETONES UR-MCNC: NORMAL
LEUKOCYTE ESTERASE UR QL STRIP: ABNORMAL
NITRITE UR QL STRIP: NORMAL
PH UR STRIP: 7
PROT UR STRIP-MCNC: NORMAL
SP GR UR STRIP: 1.02

## 2022-10-11 PROCEDURE — 90471 IMMUNIZATION ADMIN: CPT

## 2022-10-11 PROCEDURE — 99213 OFFICE O/P EST LOW 20 MIN: CPT | Mod: TH,25

## 2022-10-11 PROCEDURE — 81003 URINALYSIS AUTO W/O SCOPE: CPT | Mod: QW

## 2022-10-11 PROCEDURE — 76816 OB US FOLLOW-UP PER FETUS: CPT | Mod: 59

## 2022-10-11 PROCEDURE — 90715 TDAP VACCINE 7 YRS/> IM: CPT

## 2022-10-11 PROCEDURE — 76819 FETAL BIOPHYS PROFIL W/O NST: CPT

## 2022-10-21 ENCOUNTER — APPOINTMENT (OUTPATIENT)
Dept: ANTEPARTUM | Facility: CLINIC | Age: 42
End: 2022-10-21

## 2022-10-25 ENCOUNTER — TRANSCRIPTION ENCOUNTER (OUTPATIENT)
Age: 42
End: 2022-10-25

## 2022-10-26 ENCOUNTER — APPOINTMENT (OUTPATIENT)
Dept: OBGYN | Facility: CLINIC | Age: 42
End: 2022-10-26

## 2022-11-08 ENCOUNTER — APPOINTMENT (OUTPATIENT)
Dept: PEDIATRICS | Facility: CLINIC | Age: 42
End: 2022-11-08

## 2022-11-08 DIAGNOSIS — Z76.81 EXPECTANT PARENT(S) PREBIRTH PEDIATRICIAN VISIT: ICD-10-CM

## 2022-11-08 PROCEDURE — 99212 OFFICE O/P EST SF 10 MIN: CPT

## 2022-11-10 PROBLEM — Z76.81 PEDIATRIC PRE-BIRTH VISIT: Status: ACTIVE | Noted: 2022-11-10

## 2022-11-10 NOTE — DISCUSSION/SUMMARY
[FreeTextEntry1] : Z76.81 Pediatric Prebirth Visit\par [id number] Consult\par Inquiries for Prenatal problems that may have implications for the baby reviewed\par Breast-feeding best practices, benefits, risks of not breast-feeding also reviewed.  [email or web address] to access good information regarding breast-feeding in the first 3 days of life and beyond provided. \par Fist day and days of breast feeding summarized.  \par Importance of maternal self care as a tactic to improve breast feeding, including fixing painful latching, was emphasized.\par Risks and benefits of vaccines as well as not vaccinating within the timeframe recommended was reviewed.  This included the hepatitis B vaccine in the hospital that will be recommended.\par How to get in touch with the doctor on-call, and how to access the right online care in a de-escalating manner was reviewed.  Especially, through [email or web address], tools tab, and symptom .  This may decrease the perceived need for unnecessary access to care, and direct toward urgent care more when appropriate.  For example, 100.2 F temperature rectally is not necessarily a fever. Generic searches of fever in an infant may direct parents to emergent care unnecessarily. \par A variety of other questions were reviewed regarding newborns and  beyond that.  \par Age and development based approach to parenting reviewed. \par We will see the baby within approximately  2 days of leaving the hospital.\par .

## 2023-04-17 ENCOUNTER — NON-APPOINTMENT (OUTPATIENT)
Age: 43
End: 2023-04-17

## 2023-04-18 ENCOUNTER — APPOINTMENT (OUTPATIENT)
Dept: OBGYN | Facility: CLINIC | Age: 43
End: 2023-04-18
Payer: SELF-PAY

## 2023-04-18 VITALS
BODY MASS INDEX: 21.33 KG/M2 | HEIGHT: 65 IN | SYSTOLIC BLOOD PRESSURE: 118 MMHG | DIASTOLIC BLOOD PRESSURE: 74 MMHG | WEIGHT: 128 LBS

## 2023-04-18 DIAGNOSIS — B97.7 PAPILLOMAVIRUS AS THE CAUSE OF DISEASES CLASSIFIED ELSEWHERE: ICD-10-CM

## 2023-04-18 DIAGNOSIS — Z71.85 ENCOUNTER FOR IMMUNIZATION SAFETY COUNSELING: ICD-10-CM

## 2023-04-18 PROCEDURE — 99213 OFFICE O/P EST LOW 20 MIN: CPT

## 2023-04-21 PROBLEM — Z71.85 HPV VACCINE COUNSELING: Status: ACTIVE | Noted: 2023-04-21

## 2023-04-21 PROBLEM — B97.7 HPV IN FEMALE: Status: ACTIVE | Noted: 2019-09-05

## 2023-04-21 RX ORDER — IBUPROFEN 600 MG/1
600 TABLET, FILM COATED ORAL
Qty: 10 | Refills: 0 | Status: ACTIVE | COMMUNITY
Start: 2023-01-26

## 2023-04-21 RX ORDER — AMOXICILLIN 875 MG/1
875 TABLET, FILM COATED ORAL
Qty: 10 | Refills: 0 | Status: COMPLETED | COMMUNITY
Start: 2023-01-26

## 2023-04-21 RX ORDER — HYDROCORTISONE 25 MG/G
2.5 CREAM TOPICAL
Qty: 28 | Refills: 0 | Status: COMPLETED | COMMUNITY
Start: 2022-12-06

## 2023-04-21 RX ORDER — DOCUSATE SODIUM 100 MG/1
100 CAPSULE, LIQUID FILLED ORAL
Qty: 60 | Refills: 0 | Status: ACTIVE | COMMUNITY
Start: 2022-12-04

## 2023-04-21 NOTE — COUNSELING
[Vitamins/Supplements] : vitamins/supplements [Preconception Care/ Fertility options] : preconception care, fertility options [HPV Vaccine] : HPV Vaccine [Lab Results] : lab results

## 2023-04-21 NOTE — HISTORY OF PRESENT ILLNESS
[FreeTextEntry1] : Destiney presents to re-establish care, she was previously seeing the midwife group at  but would like to re-establish here. \par Underwent PCS 4 months ago. Is Breastfeeding without difficulty.\par She had a pap smear postpartum which showed ASCUS +HPV.  \par She has not previously received gardasil.

## 2023-04-21 NOTE — DISCUSSION/SUMMARY
[FreeTextEntry1] : I recommended colposcopy based on the recent pap results, she will schedule this in a few weeks. She has had the procedure before and is familiar with it. \par I recommend that she receive the HPV vaccine. Risks and benefits were reviewed and she is considering. Pamphlet provided. \par Recommended [facility] supplements to help clear HPV. \par We discussed future pregnancy. She would like to delay pregnancy for a few more months, but due to age does not want to wait for much longer. Rec continued daily PNV. Rec spacing pregnancy at least 9 months due to risks of short IPI.

## 2023-05-02 ENCOUNTER — APPOINTMENT (OUTPATIENT)
Dept: OBGYN | Facility: CLINIC | Age: 43
End: 2023-05-02
Payer: SELF-PAY

## 2023-05-02 VITALS
HEIGHT: 65 IN | DIASTOLIC BLOOD PRESSURE: 70 MMHG | WEIGHT: 128 LBS | BODY MASS INDEX: 21.33 KG/M2 | SYSTOLIC BLOOD PRESSURE: 102 MMHG

## 2023-05-02 DIAGNOSIS — Z34.93 ENCOUNTER FOR SUPERVISION OF NORMAL PREGNANCY, UNSPECIFIED, THIRD TRIMESTER: ICD-10-CM

## 2023-05-02 DIAGNOSIS — R87.810 ATYPICAL SQUAMOUS CELLS OF UNDETERMINED SIGNIFICANCE ON CYTOLOGIC SMEAR OF CERVIX (ASC-US): ICD-10-CM

## 2023-05-02 DIAGNOSIS — M62.89 OTHER SPECIFIED DISORDERS OF MUSCLE: ICD-10-CM

## 2023-05-02 DIAGNOSIS — Z34.92 ENCOUNTER FOR SUPERVISION OF NORMAL PREGNANCY, UNSPECIFIED, SECOND TRIMESTER: ICD-10-CM

## 2023-05-02 DIAGNOSIS — Z32.02 ENCOUNTER FOR PREGNANCY TEST, RESULT NEGATIVE: ICD-10-CM

## 2023-05-02 DIAGNOSIS — R87.610 ATYPICAL SQUAMOUS CELLS OF UNDETERMINED SIGNIFICANCE ON CYTOLOGIC SMEAR OF CERVIX (ASC-US): ICD-10-CM

## 2023-05-02 LAB
HCG UR QL: NEGATIVE
QUALITY CONTROL: YES

## 2023-05-02 PROCEDURE — 57454 BX/CURETT OF CERVIX W/SCOPE: CPT

## 2023-05-02 PROCEDURE — 81025 URINE PREGNANCY TEST: CPT

## 2023-05-03 PROBLEM — Z32.02 URINE PREGNANCY TEST NEGATIVE: Status: ACTIVE | Noted: 2021-10-22

## 2023-05-03 NOTE — PROCEDURE
[Colposcopy] : Colposcopy  [Consent Obtained] : Consent obtained [Risks] : risks [Benefits] : benefits [Alternatives] : alternatives [Patient] : patient [ASCUS] : ASCUS [HPV High Risk] : HPV high risk [No Premedication] : no premedication [Colposcopy Adequate] : colposcopy adequate [Pap Performed] : pap not performed [SCI Fully Visualized] : SCI fully visualized [ECC Performed] : ECC performed [Lesion] : lesion seen [Biopsy] : biopsy taken [Tolerated Well] : the patient tolerated the procedure well [Hemostasis Obtained] : Hemostasis obtained [de-identified] : 1 [de-identified] : acetowhite area from 6-9 o clock [de-identified] : cervix 8 o clock [de-identified] : yojana yip  [de-identified] : low grade changes\par i will call the patient with results

## 2023-05-12 LAB — CORE LAB BIOPSY: NORMAL

## 2023-05-24 ENCOUNTER — APPOINTMENT (OUTPATIENT)
Dept: ORTHOPEDIC SURGERY | Facility: CLINIC | Age: 43
End: 2023-05-24

## 2024-03-21 ENCOUNTER — TRANSCRIPTION ENCOUNTER (OUTPATIENT)
Age: 44
End: 2024-03-21

## 2024-03-26 ENCOUNTER — TRANSCRIPTION ENCOUNTER (OUTPATIENT)
Age: 44
End: 2024-03-26

## 2024-04-17 ENCOUNTER — APPOINTMENT (OUTPATIENT)
Dept: OBGYN | Facility: CLINIC | Age: 44
End: 2024-04-17
Payer: SELF-PAY

## 2024-04-17 VITALS
HEIGHT: 65 IN | WEIGHT: 135 LBS | SYSTOLIC BLOOD PRESSURE: 110 MMHG | BODY MASS INDEX: 22.49 KG/M2 | DIASTOLIC BLOOD PRESSURE: 64 MMHG

## 2024-04-17 DIAGNOSIS — Z12.4 ENCOUNTER FOR SCREENING FOR MALIGNANT NEOPLASM OF CERVIX: ICD-10-CM

## 2024-04-17 DIAGNOSIS — N92.6 IRREGULAR MENSTRUATION, UNSPECIFIED: ICD-10-CM

## 2024-04-17 DIAGNOSIS — Z01.419 ENCOUNTER FOR GYNECOLOGICAL EXAMINATION (GENERAL) (ROUTINE) W/OUT ABNORMAL FINDINGS: ICD-10-CM

## 2024-04-17 DIAGNOSIS — Z12.31 ENCOUNTER FOR SCREENING MAMMOGRAM FOR MALIGNANT NEOPLASM OF BREAST: ICD-10-CM

## 2024-04-17 DIAGNOSIS — Z34.91 ENCOUNTER FOR SUPERVISION OF NORMAL PREGNANCY, UNSPECIFIED, FIRST TRIMESTER: ICD-10-CM

## 2024-04-17 PROCEDURE — 99459 PELVIC EXAMINATION: CPT

## 2024-04-17 PROCEDURE — 99396 PREV VISIT EST AGE 40-64: CPT

## 2024-04-17 NOTE — HISTORY OF PRESENT ILLNESS
[No] : Patient does not have concerns regarding sex [Currently Active] : currently active [Patient refuses STI testing] : Patient refuses STI testing [FreeTextEntry1] : Destiney is a  LMP 24 who presents for annual exam. She is without complaints. Denies pelvic pain, abnormal bleeding, or vaginal discharge. Denies issues with bowel or bladder function.  Reports that menses have been irregular. She used to have 24 day cycle and is now getting 18-22 day cycles. History of Frag X She is sexually active with male partner (s). She is not using contraception.  Lives with family. Works as . Feels safe at home. Denies abuse.   Depression screening is positive.  She reports stress at home. She is in therapy. Declines antidepresants.

## 2024-04-19 LAB — HPV HIGH+LOW RISK DNA PNL CVX: NOT DETECTED

## 2024-05-01 ENCOUNTER — TRANSCRIPTION ENCOUNTER (OUTPATIENT)
Age: 44
End: 2024-05-01

## 2024-05-03 LAB — CYTOLOGY CVX/VAG DOC THIN PREP: ABNORMAL

## 2024-05-30 ENCOUNTER — TRANSCRIPTION ENCOUNTER (OUTPATIENT)
Age: 44
End: 2024-05-30

## 2024-05-30 LAB
ANTI-MUELLERIAN HORMONE: 0.43 NG/ML
ESTRADIOL SERPL-MCNC: 86 PG/ML
FSH SERPL-MCNC: 6.5 IU/L
LH SERPL-ACNC: 3.4 IU/L
PROLACTIN SERPL-MCNC: 5.6 NG/ML
TSH SERPL-ACNC: 1.02 UIU/ML

## 2024-07-19 ENCOUNTER — TRANSCRIPTION ENCOUNTER (OUTPATIENT)
Age: 44
End: 2024-07-19

## 2024-08-26 ENCOUNTER — TRANSCRIPTION ENCOUNTER (OUTPATIENT)
Age: 44
End: 2024-08-26

## 2024-08-29 ENCOUNTER — NON-APPOINTMENT (OUTPATIENT)
Age: 44
End: 2024-08-29

## 2024-09-03 ENCOUNTER — OUTPATIENT (OUTPATIENT)
Dept: OUTPATIENT SERVICES | Facility: HOSPITAL | Age: 44
LOS: 1 days | End: 2024-09-03
Payer: OTHER GOVERNMENT

## 2024-09-03 ENCOUNTER — APPOINTMENT (OUTPATIENT)
Dept: MAMMOGRAPHY | Facility: CLINIC | Age: 44
End: 2024-09-03

## 2024-09-03 ENCOUNTER — RESULT REVIEW (OUTPATIENT)
Age: 44
End: 2024-09-03

## 2024-09-03 DIAGNOSIS — Z12.31 ENCOUNTER FOR SCREENING MAMMOGRAM FOR MALIGNANT NEOPLASM OF BREAST: ICD-10-CM

## 2024-09-03 PROCEDURE — 77063 BREAST TOMOSYNTHESIS BI: CPT | Mod: 26

## 2024-09-03 PROCEDURE — 77067 SCR MAMMO BI INCL CAD: CPT | Mod: 26

## 2024-09-03 PROCEDURE — 77063 BREAST TOMOSYNTHESIS BI: CPT

## 2024-09-03 PROCEDURE — 77067 SCR MAMMO BI INCL CAD: CPT

## 2024-09-06 ENCOUNTER — TRANSCRIPTION ENCOUNTER (OUTPATIENT)
Age: 44
End: 2024-09-06

## 2024-09-19 ENCOUNTER — APPOINTMENT (OUTPATIENT)
Dept: INTERNAL MEDICINE | Facility: CLINIC | Age: 44
End: 2024-09-19

## 2024-09-30 ENCOUNTER — TRANSCRIPTION ENCOUNTER (OUTPATIENT)
Age: 44
End: 2024-09-30

## 2024-10-03 ENCOUNTER — NON-APPOINTMENT (OUTPATIENT)
Age: 44
End: 2024-10-03

## 2024-10-15 ENCOUNTER — APPOINTMENT (OUTPATIENT)
Dept: INTERNAL MEDICINE | Facility: CLINIC | Age: 44
End: 2024-10-15
Payer: OTHER GOVERNMENT

## 2024-10-15 ENCOUNTER — NON-APPOINTMENT (OUTPATIENT)
Age: 44
End: 2024-10-15

## 2024-10-15 VITALS
DIASTOLIC BLOOD PRESSURE: 72 MMHG | BODY MASS INDEX: 21.33 KG/M2 | HEART RATE: 60 BPM | OXYGEN SATURATION: 97 % | TEMPERATURE: 97.3 F | SYSTOLIC BLOOD PRESSURE: 108 MMHG | WEIGHT: 128 LBS | HEIGHT: 65 IN | RESPIRATION RATE: 16 BRPM

## 2024-10-15 DIAGNOSIS — Z00.00 ENCOUNTER FOR GENERAL ADULT MEDICAL EXAMINATION W/OUT ABNORMAL FINDINGS: ICD-10-CM

## 2024-10-15 PROCEDURE — 99386 PREV VISIT NEW AGE 40-64: CPT

## 2024-10-15 PROCEDURE — G0444 DEPRESSION SCREEN ANNUAL: CPT | Mod: 59

## 2024-10-17 LAB
25(OH)D3 SERPL-MCNC: 40 NG/ML
ALBUMIN SERPL ELPH-MCNC: 4.5 G/DL
ALP BLD-CCNC: 68 U/L
ALT SERPL-CCNC: 10 U/L
ANION GAP SERPL CALC-SCNC: 17 MMOL/L
APPEARANCE: CLEAR
AST SERPL-CCNC: 20 U/L
BASOPHILS # BLD AUTO: 0.04 K/UL
BASOPHILS NFR BLD AUTO: 0.4 %
BILIRUB SERPL-MCNC: 0.4 MG/DL
BILIRUBIN URINE: NEGATIVE
BLOOD URINE: NEGATIVE
BUN SERPL-MCNC: 20 MG/DL
CALCIUM SERPL-MCNC: 9.2 MG/DL
CHLORIDE SERPL-SCNC: 103 MMOL/L
CHOLEST SERPL-MCNC: 156 MG/DL
CO2 SERPL-SCNC: 19 MMOL/L
COLOR: YELLOW
CREAT SERPL-MCNC: 0.81 MG/DL
EGFR: 93 ML/MIN/1.73M2
EOSINOPHIL # BLD AUTO: 0.14 K/UL
EOSINOPHIL NFR BLD AUTO: 1.5 %
ESTIMATED AVERAGE GLUCOSE: 108 MG/DL
FERRITIN SERPL-MCNC: 30 NG/ML
GLUCOSE QUALITATIVE U: NEGATIVE MG/DL
GLUCOSE SERPL-MCNC: 84 MG/DL
HBA1C MFR BLD HPLC: 5.4 %
HCT VFR BLD CALC: 40.4 %
HDLC SERPL-MCNC: 68 MG/DL
HGB BLD-MCNC: 13.3 G/DL
IMM GRANULOCYTES NFR BLD AUTO: 0.2 %
IRON SATN MFR SERPL: 27 %
IRON SERPL-MCNC: 80 UG/DL
KETONES URINE: NEGATIVE MG/DL
LDLC SERPL CALC-MCNC: 77 MG/DL
LEUKOCYTE ESTERASE URINE: NEGATIVE
LYMPHOCYTES # BLD AUTO: 2.34 K/UL
LYMPHOCYTES NFR BLD AUTO: 25.6 %
MAGNESIUM SERPL-MCNC: 2.3 MG/DL
MAN DIFF?: NORMAL
MCHC RBC-ENTMCNC: 31.3 PG
MCHC RBC-ENTMCNC: 32.9 GM/DL
MCV RBC AUTO: 95.1 FL
MONOCYTES # BLD AUTO: 0.54 K/UL
MONOCYTES NFR BLD AUTO: 5.9 %
NEUTROPHILS # BLD AUTO: 6.06 K/UL
NEUTROPHILS NFR BLD AUTO: 66.4 %
NITRITE URINE: NEGATIVE
NONHDLC SERPL-MCNC: 88 MG/DL
PH URINE: 6.5
PLATELET # BLD AUTO: 242 K/UL
POTASSIUM SERPL-SCNC: 4.3 MMOL/L
PROT SERPL-MCNC: 7 G/DL
PROTEIN URINE: NEGATIVE MG/DL
RBC # BLD: 4.25 M/UL
RBC # FLD: 12.3 %
SODIUM SERPL-SCNC: 138 MMOL/L
SPECIFIC GRAVITY URINE: 1.02
TIBC SERPL-MCNC: 293 UG/DL
TRIGL SERPL-MCNC: 52 MG/DL
TSH SERPL-ACNC: 1.01 UIU/ML
UIBC SERPL-MCNC: 213 UG/DL
URATE SERPL-MCNC: 4.3 MG/DL
UROBILINOGEN URINE: 0.2 MG/DL
VIT B12 SERPL-MCNC: 403 PG/ML
WBC # FLD AUTO: 9.14 K/UL

## 2024-12-17 ENCOUNTER — TRANSCRIPTION ENCOUNTER (OUTPATIENT)
Age: 44
End: 2024-12-17

## 2024-12-18 ENCOUNTER — APPOINTMENT (OUTPATIENT)
Dept: INTERNAL MEDICINE | Facility: CLINIC | Age: 44
End: 2024-12-18
Payer: OTHER GOVERNMENT

## 2024-12-18 DIAGNOSIS — F41.9 ANXIETY DISORDER, UNSPECIFIED: ICD-10-CM

## 2024-12-18 PROCEDURE — 99213 OFFICE O/P EST LOW 20 MIN: CPT

## 2024-12-18 PROCEDURE — G2211 COMPLEX E/M VISIT ADD ON: CPT

## 2024-12-18 RX ORDER — ESCITALOPRAM OXALATE 5 MG/1
5 TABLET ORAL
Qty: 30 | Refills: 2 | Status: ACTIVE | COMMUNITY
Start: 2024-12-18 | End: 1900-01-01

## 2025-01-03 ENCOUNTER — TRANSCRIPTION ENCOUNTER (OUTPATIENT)
Age: 45
End: 2025-01-03

## 2025-07-02 ENCOUNTER — TRANSCRIPTION ENCOUNTER (OUTPATIENT)
Age: 45
End: 2025-07-02

## 2025-07-08 ENCOUNTER — TRANSCRIPTION ENCOUNTER (OUTPATIENT)
Age: 45
End: 2025-07-08

## 2025-07-29 ENCOUNTER — APPOINTMENT (OUTPATIENT)
Dept: INTERNAL MEDICINE | Facility: CLINIC | Age: 45
End: 2025-07-29
Payer: OTHER GOVERNMENT

## 2025-07-29 VITALS
TEMPERATURE: 98 F | SYSTOLIC BLOOD PRESSURE: 106 MMHG | OXYGEN SATURATION: 99 % | BODY MASS INDEX: 20.83 KG/M2 | DIASTOLIC BLOOD PRESSURE: 84 MMHG | HEIGHT: 65 IN | HEART RATE: 58 BPM | WEIGHT: 125 LBS | RESPIRATION RATE: 16 BRPM

## 2025-07-29 DIAGNOSIS — F41.9 ANXIETY DISORDER, UNSPECIFIED: ICD-10-CM

## 2025-07-29 DIAGNOSIS — E61.1 IRON DEFICIENCY: ICD-10-CM

## 2025-07-29 DIAGNOSIS — R53.83 OTHER FATIGUE: ICD-10-CM

## 2025-07-29 PROCEDURE — G2211 COMPLEX E/M VISIT ADD ON: CPT

## 2025-07-29 PROCEDURE — 99214 OFFICE O/P EST MOD 30 MIN: CPT

## 2025-07-30 ENCOUNTER — LABORATORY RESULT (OUTPATIENT)
Age: 45
End: 2025-07-30

## 2025-07-30 LAB
25(OH)D3 SERPL-MCNC: 35.6 NG/ML
ALBUMIN SERPL ELPH-MCNC: 4.6 G/DL
ALP BLD-CCNC: 55 U/L
ALT SERPL-CCNC: 9 U/L
ANION GAP SERPL CALC-SCNC: 15 MMOL/L
AST SERPL-CCNC: 12 U/L
BILIRUB SERPL-MCNC: 0.4 MG/DL
BUN SERPL-MCNC: 19 MG/DL
CALCIUM SERPL-MCNC: 9.4 MG/DL
CHLORIDE SERPL-SCNC: 103 MMOL/L
CHOLEST SERPL-MCNC: 181 MG/DL
CO2 SERPL-SCNC: 23 MMOL/L
CREAT SERPL-MCNC: 0.89 MG/DL
EGFRCR SERPLBLD CKD-EPI 2021: 82 ML/MIN/1.73M2
ERYTHROCYTE [SEDIMENTATION RATE] IN BLOOD BY WESTERGREN METHOD: 2 MM/HR
FERRITIN SERPL-MCNC: 26 NG/ML
FOLATE SERPL-MCNC: 7.7 NG/ML
GLUCOSE SERPL-MCNC: 92 MG/DL
HCT VFR BLD CALC: 39.4 %
HDLC SERPL-MCNC: 68 MG/DL
HGB BLD-MCNC: 13 G/DL
IRON SATN MFR SERPL: 27 %
IRON SERPL-MCNC: 70 UG/DL
LDLC SERPL-MCNC: 101 MG/DL
MCHC RBC-ENTMCNC: 31.6 PG
MCHC RBC-ENTMCNC: 33 G/DL
MCV RBC AUTO: 95.6 FL
NONHDLC SERPL-MCNC: 113 MG/DL
PLATELET # BLD AUTO: 226 K/UL
POTASSIUM SERPL-SCNC: 4.4 MMOL/L
PROT SERPL-MCNC: 6.8 G/DL
RBC # BLD: 4.12 M/UL
RBC # FLD: 12.3 %
SODIUM SERPL-SCNC: 140 MMOL/L
TIBC SERPL-MCNC: 261 UG/DL
TRIGL SERPL-MCNC: 62 MG/DL
TSH SERPL-ACNC: 1.82 UIU/ML
UIBC SERPL-MCNC: 191 UG/DL
URATE SERPL-MCNC: 4.8 MG/DL
VIT B12 SERPL-MCNC: 375 PG/ML
WBC # FLD AUTO: 6.23 K/UL

## 2025-07-31 LAB
CELIAC PANEL: NORMAL
CORTISOL: 16.2 UG/DL
FSH SERPL-MCNC: 4.9 IU/L

## 2025-08-01 LAB
ALMOND IGE QN: <0.1 KUA/L
BRAZIL NUT IGE QN: <0.1 KUA/L
CASHEW NUT IGE QN: <0.1 KUA/L
CODFISH IGE QN: <0.1 KUA/L
COW MILK IGE QN: 0.12 KUA/L
DEPRECATED ALMOND IGE RAST QL: 0
DEPRECATED BRAZIL NUT IGE RAST QL: 0
DEPRECATED CASHEW NUT IGE RAST QL: 0
DEPRECATED CODFISH IGE RAST QL: 0
DEPRECATED COW MILK IGE RAST QL: NORMAL
DEPRECATED EGG WHITE IGE RAST QL: 1
DEPRECATED HAZELNUT IGE RAST QL: 2
DEPRECATED PEANUT IGE RAST QL: NORMAL
DEPRECATED SALMON IGE RAST QL: 0
DEPRECATED SESAME SEED IGE RAST QL: 0
DEPRECATED SHRIMP IGE RAST QL: 0
DEPRECATED SOYBEAN IGE RAST QL: 0
DEPRECATED TUNA IGE RAST QL: 0
DEPRECATED WALNUT IGE RAST QL: 0
DEPRECATED WHEAT IGE RAST QL: 0
EGG WHITE IGE QN: 0.65 KUA/L
ESTROGEN SERPL-MCNC: 165 PG/ML
HAZELNUT IGE QN: 2.02 KUA/L
PEANUT IGE QN: 0.14 KUA/L
SALMON IGE QN: <0.1 KUA/L
SCALLOP IGE QN: 0
SCALLOP IGE QN: <0.1 KUA/L
SESAME SEED IGE QN: <0.1 KUA/L
SHRIMP IGE QN: <0.1 KUA/L
SOYBEAN IGE QN: <0.1 KUA/L
TOTAL IGE SMQN RAST: 214 KU/L
TUNA IGE QN: <0.1 KUA/L
WALNUT IGE QN: <0.1 KUA/L
WHEAT IGE QN: <0.1 KUA/L

## 2025-08-05 LAB
A PHAGOCYTOPH IGG TITR SER IF: NORMAL
B BURGDOR AB SER QL IA: 0.35 IV
B MICROTI IGG TITR SER: NORMAL
E CHAFFEENSIS IGG TITR SER IF: NORMAL